# Patient Record
Sex: FEMALE | Race: WHITE | NOT HISPANIC OR LATINO | Employment: UNEMPLOYED | URBAN - NONMETROPOLITAN AREA
[De-identification: names, ages, dates, MRNs, and addresses within clinical notes are randomized per-mention and may not be internally consistent; named-entity substitution may affect disease eponyms.]

---
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
## 2024-01-25 ENCOUNTER — APPOINTMENT (OUTPATIENT)
Dept: CARDIOLOGY | Facility: HOSPITAL | Age: 19
End: 2024-01-25
Payer: OTHER GOVERNMENT

## 2024-02-25 ENCOUNTER — HOSPITAL ENCOUNTER (EMERGENCY)
Facility: HOSPITAL | Age: 19
Discharge: HOME | End: 2024-02-25
Attending: EMERGENCY MEDICINE
Payer: COMMERCIAL

## 2024-02-25 ENCOUNTER — APPOINTMENT (OUTPATIENT)
Dept: RADIOLOGY | Facility: HOSPITAL | Age: 19
End: 2024-02-25
Payer: COMMERCIAL

## 2024-02-25 VITALS
TEMPERATURE: 98 F | RESPIRATION RATE: 16 BRPM | HEIGHT: 65 IN | WEIGHT: 280 LBS | OXYGEN SATURATION: 100 % | HEART RATE: 108 BPM | BODY MASS INDEX: 46.65 KG/M2 | SYSTOLIC BLOOD PRESSURE: 136 MMHG | DIASTOLIC BLOOD PRESSURE: 85 MMHG

## 2024-02-25 DIAGNOSIS — M79.641 RIGHT HAND PAIN: Primary | ICD-10-CM

## 2024-02-25 PROCEDURE — 99285 EMERGENCY DEPT VISIT HI MDM: CPT

## 2024-02-25 PROCEDURE — 99282 EMERGENCY DEPT VISIT SF MDM: CPT

## 2024-02-25 RX ORDER — IBUPROFEN 600 MG/1
600 TABLET ORAL EVERY 6 HOURS PRN
Qty: 30 TABLET | Refills: 0 | Status: SHIPPED | OUTPATIENT
Start: 2024-02-25

## 2024-02-25 RX ORDER — ACETAMINOPHEN 325 MG/1
650 TABLET ORAL ONCE
Status: DISCONTINUED | OUTPATIENT
Start: 2024-02-25 | End: 2024-02-25 | Stop reason: HOSPADM

## 2024-02-25 ASSESSMENT — PAIN DESCRIPTION - ORIENTATION: ORIENTATION: RIGHT

## 2024-02-25 ASSESSMENT — PAIN DESCRIPTION - FREQUENCY: FREQUENCY: CONSTANT/CONTINUOUS

## 2024-02-25 ASSESSMENT — PAIN DESCRIPTION - LOCATION: LOCATION: HAND

## 2024-02-25 ASSESSMENT — PAIN SCALES - GENERAL: PAINLEVEL_OUTOF10: 3

## 2024-02-25 ASSESSMENT — PAIN - FUNCTIONAL ASSESSMENT: PAIN_FUNCTIONAL_ASSESSMENT: 0-10

## 2024-02-25 ASSESSMENT — PAIN DESCRIPTION - PAIN TYPE: TYPE: ACUTE PAIN

## 2024-02-26 NOTE — DISCHARGE INSTRUCTIONS
You may take Tylenol and/or ibuprofen as needed for pain.  Wear Ace wrap as needed.  Rest ice elevation.  Follow-up with primary care in 3 to 5 days for recheck as needed.

## 2024-02-26 NOTE — ED PROVIDER NOTES
Department of Emergency Medicine   ED  Provider Note  Admit Date/RoomTime: 2/25/2024  8:54 PM  ED Room: CHAIR02/CHAIR02                  History of Present Illness:   Natanael Vasques is a 18 y.o. female presenting to the ED for right hand and right wrist pain, beginning 1.5 weeks ago.  The complaint has been persistent, moderate in severity, and worsened by  movement of the Hand .  Denies trauma or injury.  He did take a dose of Tylenol yesterday and dose of Excedrin today.  No fever or chills.  No numbness tingling or weakness.      Review of Systems:   Pertinent positives and review of systems as noted above.  Remaining 10 review of systems is negative or noncontributory to today's episode of care.  Review of Systems   A complete review of systems is otherwise negative except as noted above.    --------------------------------------------- PAST HISTORY ---------------------------------------------  Past Medical History:  has a past medical history of Allergy status to unspecified drugs, medicaments and biological substances, Cellulitis of right upper limb (07/31/2013), Encounter for immunization (09/24/2013), Encounter for removal of sutures (08/05/2013), Other conditions influencing health status (08/05/2013), Other conditions influencing health status (09/24/2013), Personal history of diseases of the skin and subcutaneous tissue (03/10/2016), Personal history of other diseases of the nervous system and sense organs (11/29/2013), Personal history of other diseases of the respiratory system (09/16/2015), Personal history of other diseases of the respiratory system, Personal history of other mental and behavioral disorders, and Personal history of other mental and behavioral disorders (03/17/2015).    Past Surgical History:  has a past surgical history that includes Tonsillectomy (04/02/2013).    Social History:  reports that she has never smoked. She has never used smokeless tobacco. She reports current alcohol use.  "She reports current drug use. Drug: Marijuana.    Family History: family history includes Allergies in her father and mother; Anxiety disorder in an other family member; Asthma in an other family member; Depression in an other family member; Diabetes in an other family member; Eczema in an other family member; Hypertension in an other family member; hay fever in an other family member. Unless otherwise noted, family history is non contributory    Patient's Medications   New Prescriptions    No medications on file   Previous Medications    DROSPIRENONE-ETHINYL ESTRADIOL (JASON, GIANVI) 3-0.02 MG TABLET    Take 1 tablet by mouth once daily.    PROPRANOLOL LA (INDERAL LA) 120 MG 24 HR CAPSULE    Take 1 capsule (120 mg) by mouth once daily. Do not crush, chew, or split.    VENLAFAXINE XR (EFFEXOR-XR) 37.5 MG 24 HR CAPSULE    Take 1 capsule (37.5 mg) by mouth once daily.   Modified Medications    No medications on file   Discontinued Medications    No medications on file      The patient’s home medications have been reviewed.    Allergies: Clindamycin, Egg, Milk, Montelukast, and Sertraline    -------------------------------------------------- RESULTS -------------------------------------------------  All laboratory and radiology results have been personally reviewed by myself   LABS:  Labs Reviewed - No data to display      RADIOLOGY:  Interpreted by Radiologist.  XR hand right 3+ views    (Results Pending)   XR wrist right 3+ views    (Results Pending)       No results found for this or any previous visit (from the past 4464 hour(s)).  ------------------------- NURSING NOTES AND VITALS REVIEWED ---------------------------   The nursing notes within the ED encounter and vital signs as below have been reviewed.   /85 (BP Location: Left arm, Patient Position: Sitting)   Pulse (!) 108   Temp 36.7 °C (98 °F) (Temporal)   Resp 16   Ht 1.651 m (5' 5\")   Wt 127 kg (280 lb)   SpO2 100%   BMI 46.59 kg/m²   Oxygen " Saturation Interpretation: Normal      ---------------------------------------------------PHYSICAL EXAM--------------------------------------  Physical Exam  Vitals and nursing note reviewed.   Constitutional:       General: She is not in acute distress.     Appearance: She is well-developed. She is not ill-appearing or toxic-appearing.   HENT:      Head: Normocephalic and atraumatic.      Nose: Nose normal.      Mouth/Throat:      Mouth: Mucous membranes are moist.      Pharynx: Oropharynx is clear.   Eyes:      Conjunctiva/sclera: Conjunctivae normal.   Cardiovascular:      Rate and Rhythm: Normal rate and regular rhythm.      Heart sounds: No murmur heard.  Pulmonary:      Effort: Pulmonary effort is normal. No respiratory distress.      Breath sounds: Normal breath sounds.   Musculoskeletal:         General: Tenderness present. No swelling, deformity or signs of injury.      Cervical back: Neck supple.      Right lower leg: No edema.      Left lower leg: No edema.      Comments: Examination right hand reveals some tenderness over the dorsum of the hand.  There is a little bit of swelling over the dorsum of the hand and little bit of swelling over the dorsal radial aspect of the right wrist.  There is no gross bony deformity.  No significant erythema.  It is not hot to the touch.  No signs of infection.  No open skin wound.   Skin:     General: Skin is warm and dry.      Capillary Refill: Capillary refill takes less than 2 seconds.      Findings: No bruising, erythema or rash.   Neurological:      Mental Status: She is alert and oriented to person, place, and time.   Psychiatric:         Mood and Affect: Mood normal.            Procedures  None  ------------------------------ ED COURSE/MEDICAL DECISION MAKING----------------------    Medical Decision Making:   Patient was seen and examined by me.  Patient is given some p.o. Tylenol for pain.  An x-ray of the right hand the right wrist is offered.  Patient  declines x-ray.  There is no sign of acute bony injury.    I do not see any evidence of acute erythema or acute infection.  Patient be instructed to take ibuprofen 600 mg every 6 hours as needed for pain.  Rest ice elevation.  ACE WRAP as needed.  Follow-up with primary care in 3 to 5 days as needed sooner if worse return.        Diagnoses as of 02/25/24 2119   Right hand pain      Counseling:   The emergency provider has spoken with the patient and discussed today’s results, in addition to providing specific details for the plan of care and counseling regarding the diagnosis and prognosis.  Questions are answered at this time and they are agreeable with the plan.      --------------------------------- IMPRESSION AND DISPOSITION ---------------------------------        IMPRESSION  No diagnosis found.    DISPOSITION  Disposition: Discharge to home  Patient condition is good      Billing Provider Critical Care Time: 0 minutes     Darren Issa DO  02/25/24 2119

## 2024-07-02 ENCOUNTER — HOSPITAL ENCOUNTER (OUTPATIENT)
Dept: CARDIOLOGY | Facility: HOSPITAL | Age: 19
Discharge: HOME | End: 2024-07-02
Payer: OTHER GOVERNMENT

## 2024-07-02 DIAGNOSIS — R00.2 PALPITATIONS: ICD-10-CM

## 2024-07-02 LAB
AORTIC VALVE PEAK VELOCITY: 1.24 M/S
AV PEAK GRADIENT: 6.2 MMHG
AVA (PEAK VEL): 2.69 CM2
EJECTION FRACTION APICAL 4 CHAMBER: 66.2
EJECTION FRACTION: 65 %
LEFT ATRIUM VOLUME AREA LENGTH INDEX BSA: 16.3 ML/M2
LEFT VENTRICLE INTERNAL DIMENSION DIASTOLE: 4.01 CM (ref 3.5–6)
LEFT VENTRICULAR OUTFLOW TRACT DIAMETER: 2 CM
MITRAL VALVE E/A RATIO: 1.3
RIGHT VENTRICLE FREE WALL PEAK S': 12.8 CM/S
RIGHT VENTRICLE PEAK SYSTOLIC PRESSURE: 18.4 MMHG
TRICUSPID ANNULAR PLANE SYSTOLIC EXCURSION: 3.1 CM

## 2024-07-02 PROCEDURE — 93306 TTE W/DOPPLER COMPLETE: CPT | Performed by: STUDENT IN AN ORGANIZED HEALTH CARE EDUCATION/TRAINING PROGRAM

## 2024-07-02 PROCEDURE — 93306 TTE W/DOPPLER COMPLETE: CPT

## 2025-01-21 ENCOUNTER — OFFICE VISIT (OUTPATIENT)
Dept: URGENT CARE | Age: 20
End: 2025-01-21
Payer: OTHER GOVERNMENT

## 2025-01-21 VITALS
RESPIRATION RATE: 16 BRPM | BODY MASS INDEX: 42.6 KG/M2 | SYSTOLIC BLOOD PRESSURE: 132 MMHG | HEART RATE: 98 BPM | WEIGHT: 256 LBS | OXYGEN SATURATION: 95 % | TEMPERATURE: 98.9 F | DIASTOLIC BLOOD PRESSURE: 80 MMHG

## 2025-01-21 DIAGNOSIS — R50.9 FEVER, UNSPECIFIED FEVER CAUSE: Primary | ICD-10-CM

## 2025-01-21 DIAGNOSIS — J01.00 ACUTE NON-RECURRENT MAXILLARY SINUSITIS: ICD-10-CM

## 2025-01-21 DIAGNOSIS — H66.92 LEFT OTITIS MEDIA, UNSPECIFIED OTITIS MEDIA TYPE: ICD-10-CM

## 2025-01-21 DIAGNOSIS — J34.89 SINUS PAIN: ICD-10-CM

## 2025-01-21 RX ORDER — AMOXICILLIN AND CLAVULANATE POTASSIUM 875; 125 MG/1; MG/1
1 TABLET, FILM COATED ORAL EVERY 12 HOURS
Qty: 14 TABLET | Refills: 0 | Status: SHIPPED | OUTPATIENT
Start: 2025-01-21 | End: 2025-01-28

## 2025-01-21 ASSESSMENT — ENCOUNTER SYMPTOMS
ABDOMINAL PAIN: 0
NAUSEA: 0
FEVER: 1
VOMITING: 0
RHINORRHEA: 1
FATIGUE: 1
CHILLS: 1
SHORTNESS OF BREATH: 0
HEADACHES: 1
DIARRHEA: 0
SINUS PRESSURE: 1
SINUS PAIN: 1
COUGH: 1
SORE THROAT: 1

## 2025-01-21 NOTE — PROGRESS NOTES
Subjective   Patient ID: Natanael Vasques is a 19 y.o. female. They present today with a chief complaint of Nausea, Ear Pressure, Pressure Behind the Eyes (For 1 day), and Nasal Congestion (Was sick for 5 weeks has been on and off).    History of Present Illness  See mdm      History provided by:  Patient      Past Medical History  Allergies as of 01/21/2025 - Reviewed 01/21/2025   Allergen Reaction Noted    Clindamycin Unknown 10/06/2023    Egg Hives 04/07/2016    Milk Hives 04/07/2016    Montelukast Unknown 10/06/2023    Sertraline Other 10/06/2023       (Not in a hospital admission)       Past Medical History:   Diagnosis Date    Allergy status to unspecified drugs, medicaments and biological substances     History of allergy    Cellulitis of right upper limb 07/31/2013    Cellulitis of right forearm    Encounter for immunization 09/24/2013    Need for prophylactic vaccination and inoculation against influenza    Encounter for removal of sutures 08/05/2013    Encounter for removal of sutures    Other conditions influencing health status 08/05/2013    Dog Bite    Other conditions influencing health status 09/24/2013    Urinary Tract Infection    Personal history of diseases of the skin and subcutaneous tissue 03/10/2016    History of dermatitis    Personal history of other diseases of the nervous system and sense organs 11/29/2013    History of acute otitis media    Personal history of other diseases of the respiratory system 09/16/2015    History of acute sinusitis    Personal history of other diseases of the respiratory system     Personal history of asthma    Personal history of other mental and behavioral disorders     History of attention deficit hyperactivity disorder    Personal history of other mental and behavioral disorders 03/17/2015    History of anxiety       Past Surgical History:   Procedure Laterality Date    TONSILLECTOMY  04/02/2013    Tonsillectomy With Adenoidectomy        reports that she has  never smoked. She has never used smokeless tobacco. She reports current alcohol use. She reports current drug use. Drug: Marijuana.    Review of Systems  Review of Systems   Constitutional:  Positive for chills, fatigue and fever.   HENT:  Positive for ear pain, postnasal drip, rhinorrhea, sinus pressure, sinus pain and sore throat. Negative for congestion.    Respiratory:  Positive for cough. Negative for shortness of breath.    Cardiovascular:  Negative for chest pain.   Gastrointestinal:  Negative for abdominal pain, diarrhea, nausea and vomiting.   Neurological:  Positive for headaches.   All other systems reviewed and are negative.                                 Objective    Vitals:    01/21/25 1735   BP: 132/80   BP Location: Left arm   Patient Position: Sitting   BP Cuff Size: Large adult   Pulse: 98   Resp: 16   Temp: 37.2 °C (98.9 °F)   TempSrc: Oral   SpO2: 95%   Weight: 116 kg (256 lb)     Patient's last menstrual period was 11/02/2024.    Physical Exam  Vitals and nursing note reviewed.   Constitutional:       Appearance: Normal appearance.   HENT:      Head: Normocephalic.      Right Ear: Tympanic membrane normal.      Left Ear: Tympanic membrane is erythematous.      Nose: Nasal tenderness, mucosal edema, congestion and rhinorrhea present.      Right Turbinates: Enlarged and swollen.      Left Turbinates: Enlarged and swollen.      Right Sinus: Maxillary sinus tenderness and frontal sinus tenderness present.      Left Sinus: Maxillary sinus tenderness and frontal sinus tenderness present.      Mouth/Throat:      Mouth: Mucous membranes are moist.      Pharynx: Posterior oropharyngeal erythema and postnasal drip present.   Eyes:      Pupils: Pupils are equal, round, and reactive to light.   Cardiovascular:      Rate and Rhythm: Normal rate and regular rhythm.   Pulmonary:      Effort: Pulmonary effort is normal.      Breath sounds: Normal breath sounds.   Abdominal:      General: Bowel sounds are  normal.      Palpations: Abdomen is soft.   Skin:     General: Skin is warm and dry.      Capillary Refill: Capillary refill takes less than 2 seconds.   Neurological:      General: No focal deficit present.      Mental Status: She is alert.   Psychiatric:         Mood and Affect: Mood normal.         Procedures    Point of Care Test & Imaging Results from this visit  No results found for this visit on 01/21/25.   No results found.    Diagnostic study results (if any) were reviewed by Crystal L Severino, APRN-CNP.    Assessment/Plan   Allergies, medications, history, and pertinent labs/EKGs/Imaging reviewed by Crystal L Severino, APRN-CNP.     Medical Decision Making  19-year-old female presents with complaints of fever, nasal pain pressure and congestion, ear pressure, nausea, and pressure behind her eyes.  She states all of her sinus issues have been present for about 5 weeks.  She denies any cough or shortness of breath, no chest pain, no nausea vomiting or diarrhea.  ENT exam as above consistent with acute sinusitis and left OM.  Patient is given Rx for antibiotics.  At time of discharge patient was clinically well-appearing and HDS for outpatient management. The patient and/or family was educated regarding diagnosis, supportive care, OTC and Rx medications. The patient and/or family was given the opportunity to ask questions prior to discharge.  They verbalized understanding of my discussion of the plans for treatment, expected course, indications to return to  or seek further evaluation in ED, and the need for timely follow up as directed.   They were provided with a work/school excuse if requested.      Orders and Diagnoses  Diagnoses and all orders for this visit:  Fever, unspecified fever cause  Sinus pain  Acute non-recurrent maxillary sinusitis  -     amoxicillin-pot clavulanate (Augmentin) 875-125 mg tablet; Take 1 tablet by mouth every 12 hours for 7 days.  Left otitis media, unspecified otitis media  type  -     amoxicillin-pot clavulanate (Augmentin) 875-125 mg tablet; Take 1 tablet by mouth every 12 hours for 7 days.     Warm compress/heating pad to affected ear  If symptoms persist/worsen, follow-up with your PCP or ENT for further evaluation   Post Nasal Drip:  Claritin or Zyrtec everyday for one week   Humidified air  OTC nasal spray as needed   Mucinex DM  Tylenol/ibuprofen as needed for pain/discomfort      Medical Admin Record      Patient disposition: Home    Electronically signed by Crystal L Severino, APRN-CNP  5:48 PM

## 2025-02-27 ENCOUNTER — HOSPITAL ENCOUNTER (EMERGENCY)
Facility: HOSPITAL | Age: 20
Discharge: HOME | End: 2025-02-27
Attending: EMERGENCY MEDICINE
Payer: COMMERCIAL

## 2025-02-27 VITALS
HEART RATE: 111 BPM | BODY MASS INDEX: 43.32 KG/M2 | WEIGHT: 260 LBS | SYSTOLIC BLOOD PRESSURE: 137 MMHG | HEIGHT: 65 IN | OXYGEN SATURATION: 100 % | TEMPERATURE: 98.1 F | DIASTOLIC BLOOD PRESSURE: 88 MMHG | RESPIRATION RATE: 17 BRPM

## 2025-02-27 DIAGNOSIS — S61.219A FINGER LACERATION, INITIAL ENCOUNTER: Primary | ICD-10-CM

## 2025-02-27 PROCEDURE — 99283 EMERGENCY DEPT VISIT LOW MDM: CPT | Performed by: EMERGENCY MEDICINE

## 2025-02-27 PROCEDURE — 12001 RPR S/N/AX/GEN/TRNK 2.5CM/<: CPT | Performed by: EMERGENCY MEDICINE

## 2025-02-27 ASSESSMENT — COLUMBIA-SUICIDE SEVERITY RATING SCALE - C-SSRS
6. HAVE YOU EVER DONE ANYTHING, STARTED TO DO ANYTHING, OR PREPARED TO DO ANYTHING TO END YOUR LIFE?: NO
1. IN THE PAST MONTH, HAVE YOU WISHED YOU WERE DEAD OR WISHED YOU COULD GO TO SLEEP AND NOT WAKE UP?: NO
2. HAVE YOU ACTUALLY HAD ANY THOUGHTS OF KILLING YOURSELF?: NO

## 2025-02-27 ASSESSMENT — PAIN DESCRIPTION - LOCATION: LOCATION: FINGER (COMMENT WHICH ONE)

## 2025-02-27 ASSESSMENT — PAIN DESCRIPTION - PAIN TYPE: TYPE: ACUTE PAIN

## 2025-02-27 ASSESSMENT — PAIN - FUNCTIONAL ASSESSMENT: PAIN_FUNCTIONAL_ASSESSMENT: 0-10

## 2025-02-27 ASSESSMENT — PAIN SCALES - GENERAL: PAINLEVEL_OUTOF10: 9

## 2025-02-27 NOTE — ED PROVIDER NOTES
HPI   Chief Complaint   Patient presents with    Laceration     Tip of right middle finger, patient was cutting with knife       HPI  19-year-old female presents with laceration to her right third finger.  She was using a knife and cut the distal volar portion of her finger.  Just prior to arrival emerged department.  No other complaints.      Patient History   Past Medical History:   Diagnosis Date    Allergy status to unspecified drugs, medicaments and biological substances     History of allergy    Cellulitis of right upper limb 07/31/2013    Cellulitis of right forearm    Encounter for immunization 09/24/2013    Need for prophylactic vaccination and inoculation against influenza    Encounter for removal of sutures 08/05/2013    Encounter for removal of sutures    Other conditions influencing health status 08/05/2013    Dog Bite    Other conditions influencing health status 09/24/2013    Urinary Tract Infection    Personal history of diseases of the skin and subcutaneous tissue 03/10/2016    History of dermatitis    Personal history of other diseases of the nervous system and sense organs 11/29/2013    History of acute otitis media    Personal history of other diseases of the respiratory system 09/16/2015    History of acute sinusitis    Personal history of other diseases of the respiratory system     Personal history of asthma    Personal history of other mental and behavioral disorders     History of attention deficit hyperactivity disorder    Personal history of other mental and behavioral disorders 03/17/2015    History of anxiety     Past Surgical History:   Procedure Laterality Date    TONSILLECTOMY  04/02/2013    Tonsillectomy With Adenoidectomy     Family History   Problem Relation Name Age of Onset    Allergies Mother      Allergies Father      Anxiety disorder Other      Asthma Other      Depression Other      Eczema Other      Other (hay fever) Other      Hypertension Other      Diabetes Other  grandfather      Social History     Tobacco Use    Smoking status: Never    Smokeless tobacco: Never   Substance Use Topics    Alcohol use: Yes     Comment: occassional    Drug use: Yes     Types: Marijuana       Physical Exam   ED Triage Vitals [02/27/25 0249]   Temperature Heart Rate Respirations BP   36.7 °C (98.1 °F) (!) 111 17 137/88      Pulse Ox Temp Source Heart Rate Source Patient Position   100 % Oral Monitor --      BP Location FiO2 (%)     -- --       Physical Exam  General:  Awake, alert, no acute distress.  Head: Normocephalic, Atraumatic  Neck: Supple, trachea midline, no stridor  Skin: Warm and dry, no rashes   Lungs:  No acute respiratory distress, speaking in full sentences without difficulty  Neuro:  No gross focal neurologic deficits, NIH is 0  Musculoskeletal: Right third finger: Laceration volar distal finger see procedure note for details  Psychiatric:  Alert oriented x 3, Good insight into condition.    ED Course & MDM   Diagnoses as of 02/27/25 0330   Finger laceration, initial encounter                 No data recorded     Herbert Coma Scale Score: 15 (02/27/25 0309 : Ezio Iglesias RN)                           Medical Decision Making  Patient's laceration repaired with sutures.  A dressing was placed.  Wound care instructions.  Sutures out in 7 days.  Stable for discharge.    Procedure  Laceration Repair    Performed by: Brannon Quijano DO  Authorized by: Brannon Quijano DO    Consent:     Consent obtained:  Verbal    Consent given by:  Patient    Risks discussed:  Infection    Alternatives discussed:  No treatment  Universal protocol:     Patient identity confirmed:  Verbally with patient  Anesthesia:     Anesthesia method:  Local infiltration    Local anesthetic:  Lidocaine 1% w/o epi  Laceration details:     Location:  Finger    Finger location:  R long finger    Length (cm):  0.8  Pre-procedure details:     Preparation:  Patient was prepped and draped in usual sterile  fashion  Exploration:     Contaminated: no    Treatment:     Area cleansed with:  Soap and water    Amount of cleaning:  Standard    Irrigation solution:  Tap water    Visualized foreign bodies/material removed: no      Debridement:  None    Undermining:  None  Skin repair:     Repair method:  Sutures    Suture size:  4-0    Suture material:  Prolene    Suture technique:  Simple interrupted    Number of sutures:  3  Approximation:     Approximation:  Close  Repair type:     Repair type:  Simple  Post-procedure details:     Dressing:  Non-adherent dressing    Procedure completion:  Tolerated       Brannon Quijano DO  02/27/25 0336

## 2025-02-27 NOTE — DISCHARGE INSTRUCTIONS
Thank you for choosing [facility] Emergency Department. It was my pleasure to be involved in your care today.         As of today's visit, based on reasonable likelihood, that it is safe for you to be discharged back to your residence to follow-up as an outpatient for ongoing management of your medical problem. You should follow-up with any referrals / primary provider as soon as possible. The contacts (number, addresses) are listed below.         Important:  Even though we think it is safe for you to go home, there is always a small chance that we are missing something that could require hospitalization.  Therefore it is very important that if you get worse or develops any new symptoms that you return here as soon as possible to be re-evaluated.  This includes return of symptoms that have resolved such as fainting, chest pain, or symptoms that could be warning signs for stroke important:  Even though we think it is safe for you to go home, there is always a small chance that we are missing something that could require hospitalization.  Therefore it is very important that if you get worse or develops any new symptoms that you return here as soon as possible to be re-evaluated.  This includes return of symptoms that have resolved such as fainting, chest pain, or symptoms that could be warning signs for stroke         Make sure your pharmacy and primary doctor is aware of any new medications prescribed today.          It is your responsibility to contact as soon as possible, and follow through with, any referrals you were given today. We do recommend you inform them you are a [facility] follow-up patient, as often they can better accommodate your need to be seen, provided their schedules allow. We will, and have, made every effort to ensure you have access to adequate follow-up specialists available.          All problems may not be able to be fixed in one ER visit. This is why timely ongoing care is important, and this  is a responsibility you share in. Further, you are free to follow up with any provider you choose, and this is not limited to our suggestion.          If cultures were obtained today, you will be contacted should anything result that would require further treatment. Please contact the ED at the number provided with questions.          Having trouble affording medications? Try [employer].Meet My Friends! (This is not a hospital endorsed website, merely a recommendation based on my own personal experiences with [employer])

## 2025-03-10 ENCOUNTER — TELEPHONE (OUTPATIENT)
Dept: ENDOCRINOLOGY | Facility: CLINIC | Age: 20
End: 2025-03-10
Payer: OTHER GOVERNMENT

## 2025-03-14 ENCOUNTER — HOSPITAL ENCOUNTER (EMERGENCY)
Facility: HOSPITAL | Age: 20
Discharge: HOME | End: 2025-03-14
Attending: STUDENT IN AN ORGANIZED HEALTH CARE EDUCATION/TRAINING PROGRAM
Payer: OTHER GOVERNMENT

## 2025-03-14 VITALS
RESPIRATION RATE: 19 BRPM | HEIGHT: 65 IN | OXYGEN SATURATION: 98 % | WEIGHT: 270 LBS | HEART RATE: 84 BPM | DIASTOLIC BLOOD PRESSURE: 59 MMHG | BODY MASS INDEX: 44.98 KG/M2 | TEMPERATURE: 97.5 F | SYSTOLIC BLOOD PRESSURE: 103 MMHG

## 2025-03-14 DIAGNOSIS — Z48.02 VISIT FOR SUTURE REMOVAL: Primary | ICD-10-CM

## 2025-03-14 PROCEDURE — 99281 EMR DPT VST MAYX REQ PHY/QHP: CPT | Performed by: STUDENT IN AN ORGANIZED HEALTH CARE EDUCATION/TRAINING PROGRAM

## 2025-03-14 ASSESSMENT — COLUMBIA-SUICIDE SEVERITY RATING SCALE - C-SSRS
2. HAVE YOU ACTUALLY HAD ANY THOUGHTS OF KILLING YOURSELF?: NO
1. IN THE PAST MONTH, HAVE YOU WISHED YOU WERE DEAD OR WISHED YOU COULD GO TO SLEEP AND NOT WAKE UP?: NO
6. HAVE YOU EVER DONE ANYTHING, STARTED TO DO ANYTHING, OR PREPARED TO DO ANYTHING TO END YOUR LIFE?: NO

## 2025-03-14 ASSESSMENT — PAIN SCALES - GENERAL: PAINLEVEL_OUTOF10: 0 - NO PAIN

## 2025-03-14 ASSESSMENT — PAIN - FUNCTIONAL ASSESSMENT: PAIN_FUNCTIONAL_ASSESSMENT: 0-10

## 2025-03-25 NOTE — ED PROVIDER NOTES
History of Present Illness     History provided by: Patient  Limitations to History: None  External Records Reviewed with Brief Summary:  Prior ED note    HPI:  Natanael Vasques is a 19 y.o. female who presents for suture removal.  No concerns for wound infection. Believes one suture may have been lost.     Physical Exam   Triage vitals:  T 36.4 °C (97.5 °F)  HR 84  /59  RR 19  O2 98 %      Physical Exam  Vitals and nursing note reviewed.   Constitutional:       General: She is not in acute distress.     Appearance: She is not ill-appearing.   HENT:      Head: Normocephalic and atraumatic.      Mouth/Throat:      Mouth: Mucous membranes are moist.      Pharynx: Oropharynx is clear.   Eyes:      Extraocular Movements: Extraocular movements intact.      Conjunctiva/sclera: Conjunctivae normal.      Pupils: Pupils are equal, round, and reactive to light.   Cardiovascular:      Rate and Rhythm: Normal rate and regular rhythm.   Pulmonary:      Effort: Pulmonary effort is normal. No respiratory distress.      Breath sounds: Normal breath sounds.   Abdominal:      General: There is no distension.      Palpations: Abdomen is soft.      Tenderness: There is no abdominal tenderness.   Musculoskeletal:         General: No swelling or deformity. Normal range of motion.      Cervical back: Normal range of motion and neck supple.   Skin:     General: Skin is warm and dry.      Capillary Refill: Capillary refill takes less than 2 seconds.      Comments: Wound and sutures noted to fingertip without drainage or bleeding   Neurological:      General: No focal deficit present.      Mental Status: She is alert and oriented to person, place, and time. Mental status is at baseline.   Psychiatric:         Mood and Affect: Mood normal.         Behavior: Behavior normal.          Medical Decision Making & ED Course   Medical Decision Makin y.o. female who presents for suture removal.  Considered wound infection, good wound  healing, delayed healing.  2 sutures removed without complication.  Remaining suture missing, expect that suture spontaneously cut and fell out.  No evidence of infection, no indication for antibiotics.  Patient stable for discharge.   ----    Social Determinants of Health which Significantly Impact Care: None identified     EKG Independent Interpretation: EKG not obtained    Independent Result Review and Interpretation: None obtained    Chronic conditions affecting the patient's care: As documented above in [facility]    The patient was discussed with the following consultants/services: None    Care Considerations: As documented above in [facility]    ED Course:  Diagnoses as of 03/25/25 1522   Visit for suture removal       Procedures   Suture Removal    Performed by: Shea Decker MD  Authorized by: Shea Decker MD    Consent:     Consent obtained:  Verbal    Consent given by:  Patient  Location:     Location:  Upper extremity    Upper extremity location:  Hand    Hand location:  R long finger  Procedure details:     Wound appearance:  No signs of infection and good wound healing    Number of sutures removed:  2  Post-procedure details:     Post-removal:  No dressing applied    Procedure completion:  Tolerated well, no immediate complications      Shea Decker MD  Emergency Medicine     Shea Decker MD  03/25/25 1526

## 2025-04-24 ENCOUNTER — TELEPHONE (OUTPATIENT)
Dept: ENDOCRINOLOGY | Facility: CLINIC | Age: 20
End: 2025-04-24
Payer: OTHER GOVERNMENT

## 2025-04-24 NOTE — TELEPHONE ENCOUNTER
Natanael's mailbox has not been set-up yet; therefore, I mailed her out a patient reminder regarding her appt ;as well as our new office location.

## 2025-06-18 NOTE — PROGRESS NOTES
"HPI           Current Medications[1]      Allergies as of 06/19/2025 - Reviewed 03/14/2025   Allergen Reaction Noted    Clindamycin Unknown 10/06/2023    Egg Hives 04/07/2016    Milk Hives 04/07/2016    Montelukast Unknown 10/06/2023    Sertraline Other 10/06/2023         Review of Systems   Cardiology: Lightheadedness-denies.  Chest pain-denies.  Leg edema-denies.  Palpitations-denies.  Respiratory: Cough-denies. Shortness of breath-denies.  Wheezing-denies.  Gastroenterology: Constipation-denies.  Diarrhea-denies.  Heartburn-denies.  Endocrinology: Cold intolerance-denies.  Heat intolerance-denies.  Sweats-denies.  Neurology: Headache-denies.  Tremor-denies.  Neuropathy in extremities-denies.  Psychology: Low energy-denies.  Irritability-denies.  Sleep disturbances-denies.      There were no vitals taken for this visit.      Labs:  Lab Results   Component Value Date    WBC 6.5 03/25/2019    RBC 4.73 03/25/2019    HGB 12.7 03/25/2019    HCT 40.0 03/25/2019     (H) 03/25/2019     Lab Results   Component Value Date    CALCIUM 9.6 03/25/2019    AST 17 03/25/2019    ALKPHOS 168 03/25/2019    BILITOT 0.6 03/25/2019    PROT 6.8 03/25/2019    ALBUMIN 4.4 03/25/2019     03/25/2019    K 3.9 03/25/2019     03/25/2019    CO2 27 03/25/2019    ANIONGAP 13 03/25/2019    BUN 8 03/25/2019    CREATININE 0.49 (L) 03/25/2019    GLUCOSE 85 03/25/2019    ALT 20 03/25/2019     Lab Results   Component Value Date    CHOL 148 03/25/2019    TRIG 102 03/25/2019    HDL 39.4 (A) 03/25/2019     No results found for: \"MICROALBCREA\"  Lab Results   Component Value Date    TSH 0.98 03/25/2019     No results found for: \"GBKZTRLS24\"  Lab Results   Component Value Date    HGBA1C 5.1 07/02/2024         Physical Exam   General Appearance: pleasant, cooperative, no acute distress  HEENT: no chemosis, no proptosis, no lid lag, no lid retraction  Neck: no lymphadenopathy, no thyromegaly, no dominant thyroid nodules  Heart: no murmurs, " regular rate and rhythm, S1 and S2  Lungs: no wheezes, no rhonci, no rales  Extremities: no lower extremity swelling      Assessment/Plan   There are no diagnoses linked to this encounter.       Follow Up:      -labs/tests/notes reviewed  -reviewed and counseled patient on medication monitoring and side effects               [1]   Current Outpatient Medications:     drospirenone-ethinyl estradioL (Yohana, Gianvi) 3-0.02 mg tablet, Take 1 tablet by mouth once daily., Disp: , Rfl:     ibuprofen 600 mg tablet, Take 1 tablet (600 mg) by mouth every 6 hours if needed for mild pain (1 - 3) or moderate pain (4 - 6)., Disp: 30 tablet, Rfl: 0    propranolol LA (Inderal LA) 120 mg 24 hr capsule, Take 1 capsule (120 mg) by mouth once daily. Do not crush, chew, or split., Disp: 90 capsule, Rfl: 0    venlafaxine XR (Effexor-XR) 37.5 mg 24 hr capsule, Take 1 capsule (37.5 mg) by mouth once daily., Disp: , Rfl:

## 2025-06-19 ENCOUNTER — APPOINTMENT (OUTPATIENT)
Dept: ENDOCRINOLOGY | Facility: CLINIC | Age: 20
End: 2025-06-19
Payer: OTHER GOVERNMENT

## 2025-12-19 ENCOUNTER — APPOINTMENT (OUTPATIENT)
Facility: CLINIC | Age: 20
End: 2025-12-19
Payer: OTHER GOVERNMENT